# Patient Record
Sex: FEMALE | Race: OTHER | Employment: UNEMPLOYED | URBAN - METROPOLITAN AREA
[De-identification: names, ages, dates, MRNs, and addresses within clinical notes are randomized per-mention and may not be internally consistent; named-entity substitution may affect disease eponyms.]

---

## 2019-12-27 ENCOUNTER — HOSPITAL ENCOUNTER (EMERGENCY)
Age: 24
Discharge: HOME | End: 2019-12-28
Attending: EMERGENCY MEDICINE
Payer: SELF-PAY

## 2019-12-27 DIAGNOSIS — J02.9 SORE THROAT: Primary | ICD-10-CM

## 2019-12-27 PROCEDURE — 99283 EMERGENCY DEPT VISIT LOW MDM: CPT | Performed by: EMERGENCY MEDICINE

## 2019-12-28 VITALS
SYSTOLIC BLOOD PRESSURE: 110 MMHG | HEIGHT: 60 IN | OXYGEN SATURATION: 100 % | HEART RATE: 72 BPM | WEIGHT: 170 LBS | TEMPERATURE: 98.5 F | BODY MASS INDEX: 33.38 KG/M2 | DIASTOLIC BLOOD PRESSURE: 70 MMHG | RESPIRATION RATE: 18 BRPM

## 2019-12-28 LAB — DEPRECATED S PYO AG THROAT QL EIA: POSITIVE

## 2019-12-28 PROCEDURE — 87880 STREP A ASSAY W/OPTIC: CPT

## 2019-12-28 RX ORDER — AMOXICILLIN AND CLAVULANATE POTASSIUM 875; 125 MG/1; MG/1
1 TABLET, FILM COATED ORAL 2 TIMES DAILY
Qty: 20 TAB | Refills: 0 | Status: SHIPPED | OUTPATIENT
Start: 2019-12-28 | End: 2020-01-07

## 2019-12-28 NOTE — ED PROVIDER NOTES
Patient is a 23 yo female with sore throat and \"pain and swelling in my tonsils\". States on and off for the past 1-2 months and states worse today. States able to swallow breath with no difficulty other than pain. NO fevers however some intermittent chills. No nausea or vomiting, no chest pain or SOB, no further complaints. Overall patient is very well appearing and in NAD. The history is provided by the patient. The history is limited by a language barrier. A  was used. Sore Throat    Pertinent negatives include no diarrhea, no vomiting, no headaches, no shortness of breath and no cough. No past medical history on file. No past surgical history on file. No family history on file.     Social History     Socioeconomic History    Marital status:      Spouse name: Not on file    Number of children: Not on file    Years of education: Not on file    Highest education level: Not on file   Occupational History    Not on file   Social Needs    Financial resource strain: Not on file    Food insecurity:     Worry: Not on file     Inability: Not on file    Transportation needs:     Medical: Not on file     Non-medical: Not on file   Tobacco Use    Smoking status: Not on file   Substance and Sexual Activity    Alcohol use: Not on file    Drug use: Not on file    Sexual activity: Not on file   Lifestyle    Physical activity:     Days per week: Not on file     Minutes per session: Not on file    Stress: Not on file   Relationships    Social connections:     Talks on phone: Not on file     Gets together: Not on file     Attends Hinduism service: Not on file     Active member of club or organization: Not on file     Attends meetings of clubs or organizations: Not on file     Relationship status: Not on file    Intimate partner violence:     Fear of current or ex partner: Not on file     Emotionally abused: Not on file     Physically abused: Not on file     Forced sexual activity: Not on file   Other Topics Concern    Not on file   Social History Narrative    Not on file         ALLERGIES: Patient has no known allergies. Review of Systems   Constitutional: Negative for chills and fever. HENT: Positive for sore throat. Negative for rhinorrhea. Eyes: Negative for visual disturbance. Respiratory: Negative for cough and shortness of breath. Cardiovascular: Negative for chest pain and leg swelling. Gastrointestinal: Negative for abdominal pain, diarrhea, nausea and vomiting. Genitourinary: Negative for dysuria. Musculoskeletal: Negative for back pain and neck pain. Skin: Negative for rash. Neurological: Negative for weakness and headaches. Psychiatric/Behavioral: The patient is not nervous/anxious. Vitals:    12/27/19 2231   BP: 107/69   Pulse: 74   Resp: 16   Temp: 98.2 °F (36.8 °C)   SpO2: 100%   Weight: 77.1 kg (170 lb)   Height: 5' (1.524 m)            Physical Exam  Vitals signs and nursing note reviewed. Constitutional:       Appearance: She is well-developed. HENT:      Head: Normocephalic. Comments: Throat with mild erythema with swelling of tonsils without exudate. Uvula midline, no trismus, voice normal, no trouble swallowing or breathing. Minimal cervical lymphadenopathy bilaterally       Right Ear: External ear normal.      Left Ear: External ear normal.   Eyes:      Conjunctiva/sclera: Conjunctivae normal.      Pupils: Pupils are equal, round, and reactive to light. Neck:      Musculoskeletal: Normal range of motion and neck supple. Trachea: No tracheal deviation. Cardiovascular:      Rate and Rhythm: Normal rate and regular rhythm. Heart sounds: Normal heart sounds. No murmur. Pulmonary:      Effort: Pulmonary effort is normal. No respiratory distress. Breath sounds: Normal breath sounds. Abdominal:      Palpations: Abdomen is soft. Tenderness: There is no tenderness.    Musculoskeletal: Normal range of motion. Skin:     Findings: No rash. Neurological:      Mental Status: She is alert and oriented to person, place, and time. Cranial Nerves: No cranial nerve deficit. MDM  Number of Diagnoses or Management Options  Sore throat: new and requires workup     Amount and/or Complexity of Data Reviewed  Clinical lab tests: reviewed and ordered  Review and summarize past medical records: yes    Risk of Complications, Morbidity, and/or Mortality  Presenting problems: moderate  Diagnostic procedures: moderate  Management options: moderate    Patient Progress  Patient progress: stable         Procedures      23 yo female with sore throat: Will check strep swab and given chronicity will treat with abx and patient to follow up with ENT for recheck and further management or return with any worsening pain or any trouble breathing or swallowing or any further concerns.

## 2019-12-28 NOTE — ED NOTES
I have reviewed discharge instructions with the patient. The patient verbalized understanding. Patient left ED via Discharge Method: ambulatory to Home with . Opportunity for questions and clarification provided. Patient given 1 scripts. To continue your aftercare when you leave the hospital, you may receive an automated call from our care team to check in on how you are doing. This is a free service and part of our promise to provide the best care and service to meet your aftercare needs.  If you have questions, or wish to unsubscribe from this service please call [phone]. Thank you for Choosing our [facility] Emergency Department.

## 2019-12-28 NOTE — PROGRESS NOTES
present for registration and triage. Kayla Robertson 136 TM   CERTIFIED HEALTHCARE INTERPRETERTM  [email or web address]  [address] / [place], [address] [facility]  [email or web address]

## 2019-12-28 NOTE — ED TRIAGE NOTES
Pt arrives from home with spouse with c/o sore throat x two months. Pt gave birth 3 molnths ago and is breast feeding, has not taken anything for throat. Family hx of throat Ca, concerned. Denies smoking. Denies cough, but has some trouble swallowing.

## 2019-12-28 NOTE — PROGRESS NOTES
present for assessment with Shanelle MORALES, strep swab with Bozena RN, and discharge instructions. Kayla Gale 136 TM   CERTIFIED HEALTHCARE INTERPRETERTM  [email or web address]  [address] / [place], [address] [facility]  [email or web address]

## 2020-04-09 ENCOUNTER — HOSPITAL ENCOUNTER (EMERGENCY)
Age: 25
Discharge: HOME | End: 2020-04-09
Attending: EMERGENCY MEDICINE
Payer: SELF-PAY

## 2020-04-09 VITALS
TEMPERATURE: 98.6 F | RESPIRATION RATE: 18 BRPM | BODY MASS INDEX: 27.34 KG/M2 | DIASTOLIC BLOOD PRESSURE: 57 MMHG | WEIGHT: 140 LBS | SYSTOLIC BLOOD PRESSURE: 104 MMHG | HEART RATE: 68 BPM | OXYGEN SATURATION: 99 %

## 2020-04-09 DIAGNOSIS — J02.9 PHARYNGITIS, UNSPECIFIED ETIOLOGY: Primary | ICD-10-CM

## 2020-04-09 PROCEDURE — 99283 EMERGENCY DEPT VISIT LOW MDM: CPT

## 2020-04-09 RX ORDER — AMOXICILLIN AND CLAVULANATE POTASSIUM 875; 125 MG/1; MG/1
1 TABLET, FILM COATED ORAL 2 TIMES DAILY
Qty: 14 TAB | Refills: 0 | Status: SHIPPED | OUTPATIENT
Start: 2020-04-09

## 2020-04-09 RX ORDER — DEXAMETHASONE 6 MG/1
TABLET ORAL
Qty: 3 TAB | Refills: 0 | Status: SHIPPED | OUTPATIENT
Start: 2020-04-09

## 2020-04-10 NOTE — ED TRIAGE NOTES
Pt Syriac speaking. Arrives ambulatory to ED wearing mask. Complaints of swollen tonsils. Reports she was here in December and was diagnosed with strep throat and has not gotten better since then. finished her antibiotics. Denies fevers/chills/n/v. Patient throat reddened with some white patches.

## 2020-04-10 NOTE — PROGRESS NOTES
Hospital  present over the phone for triage and assessment with Dr. Mart Mai.       [address] [facility]  Language Services Department  [address]  [address] [phone] (phone)

## 2020-04-10 NOTE — DISCHARGE INSTRUCTIONS
Follow-up with [facility], call them to make an appointment. Return to the emergency department if your symptoms worsen despite the prescription medicines.

## 2020-04-10 NOTE — PROGRESS NOTES
used for discharge.       [address] [facility]  Language Services Department  [place] [address]  [address] [phone] (phone)

## 2020-04-10 NOTE — ED PROVIDER NOTES
Patient was interviewed with the help of an  as she only speaks [place] She states she was seen here in December with a sore throat, was diagnosed with strep throat and treated with amoxicillin. She took the antibiotics but states that she continues to have a sore throat. She denies any fever or chills, has had no nausea or vomiting. Her throat hurts worse when she tries to eat or drink. She denies any known sick contacts. No past medical history on file. No past surgical history on file. No family history on file. Social History     Socioeconomic History    Marital status:      Spouse name: Not on file    Number of children: Not on file    Years of education: Not on file    Highest education level: Not on file   Occupational History    Not on file   Social Needs    Financial resource strain: Not on file    Food insecurity     Worry: Not on file     Inability: Not on file    Transportation needs     Medical: Not on file     Non-medical: Not on file   Tobacco Use    Smoking status: Not on file   Substance and Sexual Activity    Alcohol use: Not on file    Drug use: Not on file    Sexual activity: Not on file   Lifestyle    Physical activity     Days per week: Not on file     Minutes per session: Not on file    Stress: Not on file   Relationships    Social connections     Talks on phone: Not on file     Gets together: Not on file     Attends Jainism service: Not on file     Active member of club or organization: Not on file     Attends meetings of clubs or organizations: Not on file     Relationship status: Not on file    Intimate partner violence     Fear of current or ex partner: Not on file     Emotionally abused: Not on file     Physically abused: Not on file     Forced sexual activity: Not on file   Other Topics Concern    Not on file   Social History Narrative    Not on file         ALLERGIES: Patient has no known allergies.     Review of Systems Constitutional: Negative for chills and fever. HENT: Positive for sore throat. Gastrointestinal: Negative for nausea and vomiting. All other systems reviewed and are negative. Vitals:    04/09/20 2116   BP: 109/67   Pulse: 76   Resp: 16   Temp: 98.6 °F (37 °C)   SpO2: 99%   Weight: 63.5 kg (140 lb)            Physical Exam  Vitals signs and nursing note reviewed. Constitutional:       Appearance: Normal appearance. She is well-developed. HENT:      Head: Normocephalic and atraumatic. Mouth/Throat:      Pharynx: Oropharyngeal exudate and posterior oropharyngeal erythema present. Eyes:      Conjunctiva/sclera: Conjunctivae normal.      Pupils: Pupils are equal, round, and reactive to light. Neck:      Musculoskeletal: Normal range of motion and neck supple. Pulmonary:      Effort: Pulmonary effort is normal. No respiratory distress. Breath sounds: No wheezing. Musculoskeletal:         General: No tenderness. Skin:     General: Skin is warm and dry. Neurological:      Mental Status: She is alert.    Psychiatric:         Mood and Affect: Mood normal.         Behavior: Behavior normal.          MDM  Number of Diagnoses or Management Options  Pharyngitis, unspecified etiology: new and does not require workup  Diagnosis management comments: 9:31 PM per old records, she had a positive strep test on 60/82/3050    Risk of Complications, Morbidity, and/or Mortality  Presenting problems: moderate  Diagnostic procedures: moderate  Management options: moderate    Patient Progress  Patient progress: stable         Procedures

## 2020-04-10 NOTE — ED NOTES
I have reviewed discharge instructions with the patient. The patient verbalized understanding. Patient left ED via Discharge Method: ambulatory to Home with self. Opportunity for questions and clarification provided. Patient given 2 scripts. No esign   used. To continue your aftercare when you leave the hospital, you may receive an automated call from our care team to check in on how you are doing. This is a free service and part of our promise to provide the best care and service to meet your aftercare needs.  If you have questions, or wish to unsubscribe from this service please call [phone]. Thank you for Choosing our [facility] Emergency Department.

## 2020-06-10 ENCOUNTER — HOSPITAL ENCOUNTER (EMERGENCY)
Age: 25
Discharge: HOME | End: 2020-06-11
Attending: EMERGENCY MEDICINE
Payer: SELF-PAY

## 2020-06-10 DIAGNOSIS — J02.9 SORE THROAT: Primary | ICD-10-CM

## 2020-06-10 PROCEDURE — 87880 STREP A ASSAY W/OPTIC: CPT

## 2020-06-10 PROCEDURE — 87081 CULTURE SCREEN ONLY: CPT

## 2020-06-10 PROCEDURE — 99283 EMERGENCY DEPT VISIT LOW MDM: CPT

## 2020-06-10 PROCEDURE — 96372 THER/PROPH/DIAG INJ SC/IM: CPT

## 2020-06-11 VITALS
OXYGEN SATURATION: 98 % | SYSTOLIC BLOOD PRESSURE: 112 MMHG | RESPIRATION RATE: 16 BRPM | DIASTOLIC BLOOD PRESSURE: 80 MMHG | HEART RATE: 80 BPM | TEMPERATURE: 98.2 F

## 2020-06-11 LAB — DEPRECATED S PYO AG THROAT QL EIA: NEGATIVE

## 2020-06-11 PROCEDURE — 74011250636 HC RX REV CODE- 250/636: Performed by: EMERGENCY MEDICINE

## 2020-06-11 RX ORDER — DEXAMETHASONE SODIUM PHOSPHATE 100 MG/10ML
10 INJECTION INTRAMUSCULAR; INTRAVENOUS
Status: COMPLETED | OUTPATIENT
Start: 2020-06-11 | End: 2020-06-11

## 2020-06-11 RX ADMIN — DEXAMETHASONE SODIUM PHOSPHATE 10 MG: 10 INJECTION INTRAMUSCULAR; INTRAVENOUS at 00:41

## 2020-06-11 NOTE — ED TRIAGE NOTES
Patient arrives to ED from home. Patient arrives related to sore throat. Patient was seen here on 4/9/20 related to sore throat. Patient was given antibiotics but patient continues to have a sore throat. Denies fevers or chills. Patient denies cough. Patient stats that her throat is red and swollen.

## 2020-06-11 NOTE — ED PROVIDER NOTES
Patient is a 61-year-old  female who comes to the emergency department today complaining of sore throat. History is obtained with the assistance of family member at the bedside. In the past 6 months she has had several episodes of pharyngitis. She was treated with antibiotics in December for strep positive. States that she got better then had another course of antibiotics in April. Now the past few days having pain again and increased with swallowing    The history is provided by the patient and a relative. Sore Throat    This is a new problem. The current episode started more than 2 days ago. The problem has not changed since onset. There has been no fever. Associated symptoms include trouble swallowing. Pertinent negatives include no diarrhea, no vomiting, no congestion, no ear discharge, no shortness of breath, no stridor, no swollen glands and no cough. She has tried nothing for the symptoms. No past medical history on file. No past surgical history on file. No family history on file.     Social History     Socioeconomic History    Marital status:      Spouse name: Not on file    Number of children: Not on file    Years of education: Not on file    Highest education level: Not on file   Occupational History    Not on file   Social Needs    Financial resource strain: Not on file    Food insecurity     Worry: Not on file     Inability: Not on file    Transportation needs     Medical: Not on file     Non-medical: Not on file   Tobacco Use    Smoking status: Not on file   Substance and Sexual Activity    Alcohol use: Not on file    Drug use: Not on file    Sexual activity: Not on file   Lifestyle    Physical activity     Days per week: Not on file     Minutes per session: Not on file    Stress: Not on file   Relationships    Social connections     Talks on phone: Not on file     Gets together: Not on file     Attends Mandaen service: Not on file     Active member of club or organization: Not on file     Attends meetings of clubs or organizations: Not on file     Relationship status: Not on file    Intimate partner violence     Fear of current or ex partner: Not on file     Emotionally abused: Not on file     Physically abused: Not on file     Forced sexual activity: Not on file   Other Topics Concern    Not on file   Social History Narrative    Not on file         ALLERGIES: Patient has no known allergies. Review of Systems   Constitutional: Negative for chills, fatigue and fever. HENT: Positive for sore throat and trouble swallowing. Negative for congestion, ear discharge and rhinorrhea. Eyes: Negative for pain, discharge and visual disturbance. Respiratory: Negative for cough, shortness of breath and stridor. Cardiovascular: Negative for chest pain and palpitations. Gastrointestinal: Negative for abdominal pain, diarrhea, nausea and vomiting. Endocrine: Negative for polydipsia and polyuria. Genitourinary: Negative for dysuria, frequency and urgency. Musculoskeletal: Negative for back pain and neck pain. Skin: Negative for rash. Neurological: Negative for seizures, syncope and weakness. Hematological: Negative. Vitals:    06/10/20 2324   BP: 106/71   Pulse: 77   Resp: 16   Temp: 98.2 °F (36.8 °C)   SpO2: 98%            Physical Exam  Vitals signs and nursing note reviewed. Constitutional:       Appearance: She is well-developed. HENT:      Head: Normocephalic and atraumatic. Nose: No congestion or rhinorrhea. Mouth/Throat:      Pharynx: Uvula midline. Posterior oropharyngeal erythema present. No oropharyngeal exudate. Tonsils: No tonsillar exudate or tonsillar abscesses. 1+ on the right. 1+ on the left. Pulmonary:      Effort: Pulmonary effort is normal.   Skin:     General: Skin is warm and dry. Findings: No rash. Neurological:      General: No focal deficit present.       Mental Status: She is alert and oriented to person, place, and time. MDM  Number of Diagnoses or Management Options  Diagnosis management comments: I wore appropriate PPE throughout this patient's ED visit. Sachin Nielsen MD, 11:44 PM    12:27 AM  Rapid strep swab negative    Afebrile here no sign of exudate or bacterial infection I think this may all be viral.  I will give her a shot of Decadron for symptomatic relief    Voice dictation software was used during the making of this note. This software is not perfect and grammatical and other typographical errors may be present. This note has been proofread, but may still contain errors.   Sachin Nielsen MD; 6/11/2020 @12:27 AM   ===================================================================           Amount and/or Complexity of Data Reviewed  Clinical lab tests: ordered and reviewed  Review and summarize past medical records: yes  Independent visualization of images, tracings, or specimens: yes    Risk of Complications, Morbidity, and/or Mortality  Presenting problems: low  Diagnostic procedures: minimal  Management options: minimal    Patient Progress  Patient progress: stable         Procedures

## 2020-06-11 NOTE — ED NOTES
I have reviewed discharge instructions with the patient. The patient verbalized understanding. Patient left ED via Discharge Method: ambulatory to Home. Opportunity for questions and clarification provided. Patient given 0 scripts. To continue your aftercare when you leave the hospital, you may receive an automated call from our care team to check in on how you are doing. This is a free service and part of our promise to provide the best care and service to meet your aftercare needs.  If you have questions, or wish to unsubscribe from this service please call [phone]. Thank you for Choosing our [facility] Emergency Department.

## 2020-06-12 ENCOUNTER — PATIENT OUTREACH (OUTPATIENT)
Dept: CASE MANAGEMENT | Age: 25
End: 2020-06-12

## 2020-06-13 LAB
BACTERIA SPEC CULT: NORMAL
SERVICE CMNT-IMP: NORMAL

## 2020-06-15 NOTE — PROGRESS NOTES
COVID-19 ED/Flu Clinic Initial Outreach Note    The patient was contacted regarding recent visit for viral symptoms. This author contacted the autumnn by telephone to perform post discharge call. Verified name and  with patient as identifiers. Provided introduction to self, and reason for call due to viral symptoms of infection and/or possible exposure to COVID-19. Patient presented to emergency department/flu clinic with Sore throat  Patient reported symptoms are improving  Due to no new or worsening sypmtoms the RN CTN/ACSTEPHEN was not notified for escalation. Discussed exposure protocols and quarantine with CDC Guidelines What To Do If You Are Sick   The patient was given an opportunity for questions and concerns. Stay home except to get medical care  People who are mildly ill with COVID-19 are able to isolate at home during their illness. You should restrict activities outside your home, except for getting medical care. Do not go to work, school, or public areas. Avoid using public transportation, ride-sharing, or taxis. Separate yourself from other people and animals in your home  People: As much as possible, you should stay in a specific room and away from other people in your home. Also, you should use a separate bathroom, if available. Animals: You should restrict contact with pets and other animals while you are sick with COVID-19, just like you would around other people. Although there have not been reports of pets or other animals becoming sick with COVID-19, it is still recommended that people sick with COVID-19 limit contact with animals until more information is known about the virus. When possible, have another member of your household care for your animals while you are sick. If you are sick with COVID-19, avoid contact with your pet, including petting, snuggling, being kissed or licked, and sharing food.  If you must care for your pet or be around animals while you are sick, wash your hands before and after you interact with pets and wear a facemask. Call ahead before visiting your doctor  If you have a medical appointment, call the healthcare provider and tell them that you have or may have COVID-19. This will help the healthcare provider's office take steps to keep other people from getting infected or exposed. Wear a facemask  You should wear a facemask when you are around other people (e.g., sharing a room or vehicle) or pets and before you enter a healthcare provider's office. If you are not able to wear a facemask (for example, because it causes trouble breathing), then people who live with you should not stay in the same room with you, or they should wear a facemask if they enter your room. Cover your coughs and sneezes  Cover your mouth and nose with a tissue when you cough or sneeze. Throw used tissues in a lined trash can. Immediately wash your hands with soap and water for at least 20 seconds or, if soap and water are not available, clean your hands with an alcohol-based hand  that contains at least 60% alcohol. Clean your hands often  Wash your hands often with soap and water for at least 20 seconds, especially after blowing your nose, coughing, or sneezing; going to the bathroom; and before eating or preparing food. If soap and water are not readily available, use an alcohol-based hand  with at least 60% alcohol, covering all surfaces of your hands and rubbing them together until they feel dry. Soap and water are the best option if hands are visibly dirty. Avoid touching your eyes, nose, and mouth with unwashed hands. Avoid sharing personal household items  You should not share dishes, drinking glasses, cups, eating utensils, towels, or bedding with other people or pets in your home. After using these items, they should be washed thoroughly with soap and water.   Clean all high-touch surfaces everyday  High touch surfaces include counters, tabletops, doorknobs, bathroom fixtures, toilets, phones, keyboards, tablets, and bedside tables. Also, clean any surfaces that may have blood, stool, or body fluids on them. Use a household cleaning spray or wipe, according to the label instructions. Labels contain instructions for safe and effective use of the cleaning product including precautions you should take when applying the product, such as wearing gloves and making sure you have good ventilation during use of the product. Monitor your symptoms  Seek prompt medical attention if your illness is worsening (e.g., difficulty breathing). Before seeking care, call your healthcare provider and tell them that you have, or are being evaluated for, COVID-19. Put on a facemask before you enter the facility. These steps will help the healthcare provider's office to keep other people in the office or waiting room from getting infected or exposed. Ask your healthcare provider to call the local or [facility] health department. Persons who are placed under If you have a medical emergency and need to call 911, notify the dispatch personnel that you have, or are being evaluated for COVID-19. If possible, put on a facemask before emergency medical services arrive. The patient agrees to contact the [employer] exposure line [phone], local health department [facility] ([phone]) and PCP office for questions related to their healthcare. Author provided contact information for future reference.     Patient/family/caregiver given information for [employer] and agrees to enroll: Non-English speaker    Interview done with assistance from [employer]

## 2020-08-14 NOTE — DISCHARGE INSTRUCTIONS
Use Tylenol or ibuprofen for pain or fever. Follow-up with the ear nose and throat specialist as referred. (4) excellent

## 2024-05-10 ENCOUNTER — HOSPITAL ENCOUNTER (EMERGENCY)
Age: 29
Discharge: HOME | End: 2024-05-10
Payer: MEDICAID

## 2024-05-10 VITALS
SYSTOLIC BLOOD PRESSURE: 110 MMHG | DIASTOLIC BLOOD PRESSURE: 68 MMHG | TEMPERATURE: 98 F | RESPIRATION RATE: 19 BRPM | HEART RATE: 60 BPM | OXYGEN SATURATION: 98 %

## 2024-05-10 DIAGNOSIS — Z00.00 WELL ADULT EXAM: Primary | ICD-10-CM

## 2024-05-10 PROCEDURE — 99281 EMR DPT VST MAYX REQ PHY/QHP: CPT

## 2024-05-10 NOTE — ED PROVIDER NOTES
Emergency Department Provider Note       PCP: No primary care provider on file.   Age: 28 y.o.   Sex: female     DISPOSITION Decision To Discharge 05/10/2024 12:59:11 AM       ICD-10-CM    1. Well adult exam  Z00.00         Medical Decision Making     Reviewed literature.  Discussed with patient.  Advised PCP follow-up.  Advised discontinuing use of the antibiotic for her dental pain     1 acute, uncomplicated illness or injury.  Over the counter drug management performed.  Patient was discharged risks and benefits of hospitalization were considered.  Shared medical decision making was utilized in creating the patients health plan today.    I independently ordered and reviewed each unique test.  I reviewed external records: ED visit note from an outside group.  I reviewed external records: provider visit note from outside specialist.  I reviewed external records: previous lab results from outside ED.            Voice dictation software was used during the making of this note.  This software is not perfect and grammatical and other typographical errors may be present.  This note has not been completely proofread for errors.    History     This is a 28-year-old female who presents here with spouse for complaint of \"breast-feeding issue\".  States she took some azithromycin for dental pain she was having however had to consider breast-feeding.  She then looked up on the Internet at some length and information that was safe for breast-feeding or not.  She presented here for evaluation.  She only had 2 tabs of the 500 mg azithromycin.  No fevers or chills.  No nausea or vomiting.    The history is provided by the patient. No  was used.     Physical Exam     Vitals signs and nursing note reviewed:  Vitals:    05/10/24 0033   BP: 110/68   Pulse: 60   Resp: 19   Temp: 98 °F (36.7 °C)   TempSrc: Oral   SpO2: 98%      Physical Exam  Vitals and nursing note reviewed.   Constitutional:       General: She  is not in acute distress.     Appearance: Normal appearance. She is not ill-appearing, toxic-appearing or diaphoretic.   HENT:      Head: Normocephalic and atraumatic.      Nose: Nose normal.      Mouth/Throat:      Mouth: Mucous membranes are moist.   Eyes:      Pupils: Pupils are equal, round, and reactive to light.   Cardiovascular:      Rate and Rhythm: Normal rate.   Pulmonary:      Effort: Pulmonary effort is normal. No respiratory distress.   Abdominal:      General: Abdomen is flat.      Palpations: Abdomen is soft.      Tenderness: There is no abdominal tenderness.   Musculoskeletal:         General: Normal range of motion.      Cervical back: Normal range of motion. No rigidity.   Skin:     General: Skin is warm.   Neurological:      General: No focal deficit present.      Mental Status: She is alert.   Psychiatric:         Mood and Affect: Mood normal.        Procedures     Procedures    No orders of the defined types were placed in this encounter.       Medications given during this emergency department visit:  Medications - No data to display    New Prescriptions    No medications on file        No past medical history on file.     No past surgical history on file.     Social History     Socioeconomic History    Marital status:         Previous Medications    AMOXICILLIN-CLAVULANATE (AUGMENTIN) 875-125 MG PER TABLET    Take 1 tablet by mouth 2 times daily    DEXAMETHASONE (DECADRON) 6 MG TABLET    Take by mouth today then repeat on days 3 and 5        No results found for any visits on 05/10/24.      No orders to display                No results for input(s): \"COVID19\" in the last 72 hours.       Rohit Donnelly PA  05/10/24 0100

## 2024-05-10 NOTE — ED NOTES
Patient mobility status  with no difficulty. Provider aware     I have reviewed discharge instructions with the patient.  The patient verbalized understanding.    Patient left ED via Discharge Method: ambulatory to Home with Spouse.    Opportunity for questions and clarification provided.     Patient given 0 scripts.        [id number] used.      Renee Etienne RN  05/10/24 0109

## 2024-05-10 NOTE — ED TRIAGE NOTES
Patient ambulatory to POV with . Patient took Azitomicina 1000mg. Patient took for dental pain.   researched it and was concerned it would clog up breastfeeding.  [id number] used for triage.

## 2024-10-30 ENCOUNTER — OFFICE VISIT (OUTPATIENT)
Dept: FAMILY MEDICINE CLINIC | Age: 29
End: 2024-10-30

## 2024-10-30 VITALS
WEIGHT: 170.42 LBS | TEMPERATURE: 97.2 F | RESPIRATION RATE: 16 BRPM | HEART RATE: 91 BPM | OXYGEN SATURATION: 99 % | SYSTOLIC BLOOD PRESSURE: 101 MMHG | DIASTOLIC BLOOD PRESSURE: 71 MMHG

## 2024-10-30 DIAGNOSIS — Z11.4 SCREENING FOR HIV (HUMAN IMMUNODEFICIENCY VIRUS): ICD-10-CM

## 2024-10-30 DIAGNOSIS — Z13.6 SCREENING FOR ISCHEMIC HEART DISEASE: ICD-10-CM

## 2024-10-30 DIAGNOSIS — Z11.59 NEED FOR HEPATITIS C SCREENING TEST: ICD-10-CM

## 2024-10-30 DIAGNOSIS — D53.0 ANEMIA DUE TO PROTEIN DEFICIENCY: ICD-10-CM

## 2024-10-30 DIAGNOSIS — K59.00 CONSTIPATION, UNSPECIFIED CONSTIPATION TYPE: ICD-10-CM

## 2024-10-30 DIAGNOSIS — Z00.00 ENCOUNTER FOR MEDICAL EXAMINATION TO ESTABLISH CARE: Primary | ICD-10-CM

## 2024-10-30 PROBLEM — Z34.92 SUPERVISION OF LOW-RISK PREGNANCY, SECOND TRIMESTER: Status: ACTIVE | Noted: 2023-09-19

## 2024-10-30 PROBLEM — N71.0 ACUTE ENDOMETRITIS: Status: ACTIVE | Noted: 2024-03-25

## 2024-10-30 PROBLEM — R87.810 ASCUS WITH POSITIVE HIGH RISK HPV CERVICAL: Status: ACTIVE | Noted: 2019-02-27

## 2024-10-30 PROBLEM — O28.5 ABNORMAL GENETIC TEST DURING PREGNANCY: Status: ACTIVE | Noted: 2023-10-02

## 2024-10-30 PROBLEM — I83.93 SPIDER VEINS OF BOTH LOWER EXTREMITIES: Status: ACTIVE | Noted: 2024-05-06

## 2024-10-30 PROBLEM — R87.610 ASCUS WITH POSITIVE HIGH RISK HPV CERVICAL: Status: ACTIVE | Noted: 2019-02-27

## 2024-10-30 PROBLEM — B96.89 UTI DUE TO KLEBSIELLA SPECIES: Status: ACTIVE | Noted: 2024-03-27

## 2024-10-30 PROBLEM — N39.0 UTI DUE TO KLEBSIELLA SPECIES: Status: ACTIVE | Noted: 2024-03-27

## 2024-10-30 PROBLEM — Z86.19 HISTORY OF CHLAMYDIA INFECTION: Status: ACTIVE | Noted: 2019-02-16

## 2024-10-30 PROCEDURE — 99395 PREV VISIT EST AGE 18-39: CPT | Performed by: NURSE PRACTITIONER

## 2024-10-30 RX ORDER — POLYETHYLENE GLYCOL 3350 17 G/17G
17 POWDER, FOR SOLUTION ORAL DAILY
Qty: 116 G | Refills: 0 | Status: SHIPPED | OUTPATIENT
Start: 2024-10-30 | End: 2024-11-29

## 2024-10-30 RX ORDER — ACETAMINOPHEN 325 MG/1
650 TABLET ORAL EVERY 6 HOURS PRN
COMMUNITY
Start: 2024-03-27

## 2024-10-30 RX ORDER — SENNOSIDES A AND B 8.6 MG/1
2 TABLET, FILM COATED ORAL 2 TIMES DAILY
Qty: 12 TABLET | Refills: 0 | Status: SHIPPED | OUTPATIENT
Start: 2024-10-30

## 2024-10-30 RX ORDER — SENNOSIDES A AND B 8.6 MG/1
2 TABLET, FILM COATED ORAL 2 TIMES DAILY
COMMUNITY
Start: 2024-04-09 | End: 2024-10-30 | Stop reason: SDUPTHER

## 2024-10-30 RX ORDER — PSEUDOEPHEDRINE HCL 30 MG
100 TABLET ORAL 2 TIMES DAILY PRN
COMMUNITY
Start: 2024-04-09 | End: 2024-10-30 | Stop reason: SDUPTHER

## 2024-10-30 RX ORDER — PSEUDOEPHEDRINE HCL 30 MG
100 TABLET ORAL 2 TIMES DAILY PRN
Qty: 60 CAPSULE | Refills: 0 | Status: SHIPPED | OUTPATIENT
Start: 2024-10-30

## 2024-10-30 ASSESSMENT — ENCOUNTER SYMPTOMS
WHEEZING: 0
TROUBLE SWALLOWING: 0
VOICE CHANGE: 0
VOMITING: 0
EYE DISCHARGE: 0
CHOKING: 0
ABDOMINAL DISTENTION: 0
SINUS PAIN: 0
ABDOMINAL PAIN: 0
PHOTOPHOBIA: 0
NAUSEA: 1
EYE ITCHING: 0
CONSTIPATION: 1
COUGH: 0
STRIDOR: 0
DIARRHEA: 0
EYE PAIN: 0
ALLERGIC/IMMUNOLOGIC NEGATIVE: 1
SORE THROAT: 0
RHINORRHEA: 0
APNEA: 0
SHORTNESS OF BREATH: 0
ANAL BLEEDING: 0
FACIAL SWELLING: 0
EYE REDNESS: 0
SINUS PRESSURE: 0
CHEST TIGHTNESS: 0
RECTAL PAIN: 0
BLOOD IN STOOL: 0

## 2024-10-30 ASSESSMENT — LIFESTYLE VARIABLES
HOW MANY STANDARD DRINKS CONTAINING ALCOHOL DO YOU HAVE ON A TYPICAL DAY: PATIENT DOES NOT DRINK
HOW OFTEN DO YOU HAVE A DRINK CONTAINING ALCOHOL: NEVER

## 2024-10-30 ASSESSMENT — PATIENT HEALTH QUESTIONNAIRE - PHQ9
2. FEELING DOWN, DEPRESSED OR HOPELESS: NOT AT ALL
1. LITTLE INTEREST OR PLEASURE IN DOING THINGS: NOT AT ALL
SUM OF ALL RESPONSES TO PHQ QUESTIONS 1-9: 0
SUM OF ALL RESPONSES TO PHQ9 QUESTIONS 1 & 2: 0
SUM OF ALL RESPONSES TO PHQ QUESTIONS 1-9: 0

## 2024-10-30 NOTE — PROGRESS NOTES
Negative.    Allergic/Immunologic: Negative.    Neurological: Negative.    Hematological: Negative.    Psychiatric/Behavioral: Negative.         Physical Exam  Vitals and nursing note reviewed.   Constitutional:       General: She is not in acute distress.     Appearance: Normal appearance. She is obese. She is not ill-appearing, toxic-appearing or diaphoretic.   HENT:      Head: Normocephalic and atraumatic.      Right Ear: Tympanic membrane, ear canal and external ear normal. There is no impacted cerumen.      Left Ear: Tympanic membrane, ear canal and external ear normal. There is no impacted cerumen.      Nose: Nose normal. No congestion or rhinorrhea.      Mouth/Throat:      Mouth: Mucous membranes are moist.      Pharynx: Oropharynx is clear. No oropharyngeal exudate or posterior oropharyngeal erythema.   Eyes:      General: No scleral icterus.        Right eye: No discharge.         Left eye: No discharge.      Extraocular Movements: Extraocular movements intact.      Conjunctiva/sclera: Conjunctivae normal.      Pupils: Pupils are equal, round, and reactive to light.   Neck:      Vascular: No carotid bruit.   Cardiovascular:      Rate and Rhythm: Normal rate and regular rhythm.      Pulses: Normal pulses.      Heart sounds: Normal heart sounds. No murmur heard.     No friction rub. No gallop.   Pulmonary:      Effort: Pulmonary effort is normal. No respiratory distress.      Breath sounds: Normal breath sounds. No stridor. No wheezing, rhonchi or rales.   Chest:      Chest wall: No tenderness.   Abdominal:      General: Bowel sounds are normal. There is no distension.      Palpations: Abdomen is soft. There is no mass.      Tenderness: There is abdominal tenderness. There is no right CVA tenderness, left CVA tenderness, guarding or rebound.      Hernia: No hernia is present.   Musculoskeletal:         General: No swelling, tenderness, deformity or signs of injury. Normal range of motion.      Cervical back: